# Patient Record
Sex: MALE | Race: OTHER | HISPANIC OR LATINO | URBAN - METROPOLITAN AREA
[De-identification: names, ages, dates, MRNs, and addresses within clinical notes are randomized per-mention and may not be internally consistent; named-entity substitution may affect disease eponyms.]

---

## 2021-07-15 ENCOUNTER — EMERGENCY (EMERGENCY)
Facility: HOSPITAL | Age: 61
LOS: 1 days | Discharge: HOME | End: 2021-07-15
Attending: STUDENT IN AN ORGANIZED HEALTH CARE EDUCATION/TRAINING PROGRAM | Admitting: STUDENT IN AN ORGANIZED HEALTH CARE EDUCATION/TRAINING PROGRAM
Payer: COMMERCIAL

## 2021-07-15 VITALS
DIASTOLIC BLOOD PRESSURE: 84 MMHG | OXYGEN SATURATION: 100 % | RESPIRATION RATE: 16 BRPM | HEART RATE: 76 BPM | SYSTOLIC BLOOD PRESSURE: 148 MMHG

## 2021-07-15 VITALS
TEMPERATURE: 98 F | OXYGEN SATURATION: 100 % | DIASTOLIC BLOOD PRESSURE: 95 MMHG | RESPIRATION RATE: 16 BRPM | HEART RATE: 80 BPM | SYSTOLIC BLOOD PRESSURE: 155 MMHG

## 2021-07-15 LAB
ALBUMIN SERPL ELPH-MCNC: 4.7 G/DL — SIGNIFICANT CHANGE UP (ref 3.3–5)
ALP SERPL-CCNC: 54 U/L — SIGNIFICANT CHANGE UP (ref 40–120)
ALT FLD-CCNC: 35 U/L — SIGNIFICANT CHANGE UP (ref 4–41)
ANION GAP SERPL CALC-SCNC: 13 MMOL/L — SIGNIFICANT CHANGE UP (ref 7–14)
AST SERPL-CCNC: 23 U/L — SIGNIFICANT CHANGE UP (ref 4–40)
BASOPHILS # BLD AUTO: 0.02 K/UL — SIGNIFICANT CHANGE UP (ref 0–0.2)
BASOPHILS NFR BLD AUTO: 0.4 % — SIGNIFICANT CHANGE UP (ref 0–2)
BILIRUB SERPL-MCNC: 0.3 MG/DL — SIGNIFICANT CHANGE UP (ref 0.2–1.2)
BUN SERPL-MCNC: 13 MG/DL — SIGNIFICANT CHANGE UP (ref 7–23)
CALCIUM SERPL-MCNC: 9.7 MG/DL — SIGNIFICANT CHANGE UP (ref 8.4–10.5)
CHLORIDE SERPL-SCNC: 103 MMOL/L — SIGNIFICANT CHANGE UP (ref 98–107)
CO2 SERPL-SCNC: 23 MMOL/L — SIGNIFICANT CHANGE UP (ref 22–31)
CREAT SERPL-MCNC: 0.98 MG/DL — SIGNIFICANT CHANGE UP (ref 0.5–1.3)
EOSINOPHIL # BLD AUTO: 0.02 K/UL — SIGNIFICANT CHANGE UP (ref 0–0.5)
EOSINOPHIL NFR BLD AUTO: 0.4 % — SIGNIFICANT CHANGE UP (ref 0–6)
GLUCOSE SERPL-MCNC: 134 MG/DL — HIGH (ref 70–99)
HCT VFR BLD CALC: 45.8 % — SIGNIFICANT CHANGE UP (ref 39–50)
HGB BLD-MCNC: 14.9 G/DL — SIGNIFICANT CHANGE UP (ref 13–17)
IANC: 2.9 K/UL — SIGNIFICANT CHANGE UP (ref 1.5–8.5)
IMM GRANULOCYTES NFR BLD AUTO: 0.2 % — SIGNIFICANT CHANGE UP (ref 0–1.5)
LYMPHOCYTES # BLD AUTO: 1.38 K/UL — SIGNIFICANT CHANGE UP (ref 1–3.3)
LYMPHOCYTES # BLD AUTO: 29.6 % — SIGNIFICANT CHANGE UP (ref 13–44)
MCHC RBC-ENTMCNC: 30.2 PG — SIGNIFICANT CHANGE UP (ref 27–34)
MCHC RBC-ENTMCNC: 32.5 GM/DL — SIGNIFICANT CHANGE UP (ref 32–36)
MCV RBC AUTO: 92.7 FL — SIGNIFICANT CHANGE UP (ref 80–100)
MONOCYTES # BLD AUTO: 0.33 K/UL — SIGNIFICANT CHANGE UP (ref 0–0.9)
MONOCYTES NFR BLD AUTO: 7.1 % — SIGNIFICANT CHANGE UP (ref 2–14)
NEUTROPHILS # BLD AUTO: 2.9 K/UL — SIGNIFICANT CHANGE UP (ref 1.8–7.4)
NEUTROPHILS NFR BLD AUTO: 62.3 % — SIGNIFICANT CHANGE UP (ref 43–77)
NRBC # BLD: 0 /100 WBCS — SIGNIFICANT CHANGE UP
NRBC # FLD: 0 K/UL — SIGNIFICANT CHANGE UP
PLATELET # BLD AUTO: 177 K/UL — SIGNIFICANT CHANGE UP (ref 150–400)
POTASSIUM SERPL-MCNC: 4.3 MMOL/L — SIGNIFICANT CHANGE UP (ref 3.5–5.3)
POTASSIUM SERPL-SCNC: 4.3 MMOL/L — SIGNIFICANT CHANGE UP (ref 3.5–5.3)
PROT SERPL-MCNC: 7.8 G/DL — SIGNIFICANT CHANGE UP (ref 6–8.3)
RBC # BLD: 4.94 M/UL — SIGNIFICANT CHANGE UP (ref 4.2–5.8)
RBC # FLD: 12.4 % — SIGNIFICANT CHANGE UP (ref 10.3–14.5)
SODIUM SERPL-SCNC: 139 MMOL/L — SIGNIFICANT CHANGE UP (ref 135–145)
WBC # BLD: 4.66 K/UL — SIGNIFICANT CHANGE UP (ref 3.8–10.5)
WBC # FLD AUTO: 4.66 K/UL — SIGNIFICANT CHANGE UP (ref 3.8–10.5)

## 2021-07-15 PROCEDURE — 99285 EMERGENCY DEPT VISIT HI MDM: CPT

## 2021-07-15 PROCEDURE — 99284 EMERGENCY DEPT VISIT MOD MDM: CPT

## 2021-07-15 NOTE — ED PROVIDER NOTE - NS ED ROS FT
GENERAL: No fever, no chills  EYES: +blurry vision  HEENT: no trouble swallowing, no trouble speaking  CARDIAC: no chest pain, no palpitations  PULMONARY: no cough, no SOB  GI: no abdominal pain, no nausea, no vomiting, no diarrhea, no constipation  : no dysuria, no frequency, no change in appearance, no odor of urine  SKIN: no rashes  NEURO: no headache, no weakness  MSK: no joint pain

## 2021-07-15 NOTE — ED ADULT NURSE NOTE - OBJECTIVE STATEMENT
Pt awake, alert and oriented x 4 c/o blurry vision to right eye for two days - describes it as seeing "a jellyfish."   No trauma to eye.   No facial droop, no gait change, no slurred speech, no chest pain or SOB, no fever.     IV placed and blood work sent.   Awaiting radiology and optho.

## 2021-07-15 NOTE — ED PROVIDER NOTE - OBJECTIVE STATEMENT
61y/o M w/ h/o HLD (non compliant with meds) p/w blurry vision for 2d, was driving and suddenly felt irritation and difficulty seeing, now seeing floaters in right eye. Washed eye with no relief. Floaters follow pt when looking in all directions. Denies fevers, chills, nausea, vomiting, headache, eye pain, conjunctivitis, diplopia, photophobia, pruritis, pain with ocular movements, sinus pain, contact use.

## 2021-07-15 NOTE — ED PROVIDER NOTE - NSFOLLOWUPCLINICS_GEN_ALL_ED_FT
[facility] - Ophthalmology  Ophthalmology  [address], [address]  [place] [address]  Phone: [phone]  Fax:   Follow Up Time: Urgent

## 2021-07-15 NOTE — ED ADULT TRIAGE NOTE - CHIEF COMPLAINT QUOTE
states " I am having blurry vision in my right eye since Tuesday , feels like a jelly in the middle and I can see around it". denies any injury. denies any past medical history. denies use of contacts. denies any pain. speaking in full sentences clearly.  patient is visiting from [place] since Friday.

## 2021-07-15 NOTE — CONSULT NOTE ADULT - SUBJECTIVE AND OBJECTIVE BOX
[facility] DEPARTMENT OF OPHTHALMOLOGY - INITIAL ADULT CONSULT  -----------------------------------------------------------------------------------------------------------------  Michel Dockery MD PGY 3  Pager: [phone]  -----------------------------------------------------------------------------------------------------------------    HPI: 59 y/o M with HLD who presented for right eye vision blurry vision. On Tuesday, while driving from [place] to [place], patient had foreign body sensation right eye. He then noticed he had blurry vision and little black spots centrally in the right eye with sparing of peripheral vision. Patient reports this has worsened. Denies any other similar episode. Denies any eye pain, flashes of light.    Past Medical History: HLD  Past Ocular History: None  Drops: None  Allergies: No known allergies to medications  Family History: No family history of eye diseases  Outpatient Ophthalmologist: None      Review of Systems:  Constitutional: No fever, chills  Eyes: Admits to blurry vision and floaters right eye. No flashes, FBS, erythema, discharge, double vision, OU  Neuro: No tremors  Cardiovascular: No chest pain, palpitations  Respiratory: No SOB, no cough  GI: No nausea, vomiting, abdominal pain    Vital Signs: T(C): 36.7 (07-15-21 @ 10:43)  T(F): 98.1 (07-15-21 @ 10:43), Max: 98.1 (07-15-21 @ 10:43)  HR: 80 (07-15-21 @ 10:43) (80 - 80)  BP: 155/95 (07-15-21 @ 10:43) (155/95 - 155/95)  RR:  (16 - 16)  SpO2:  (100% - 100%)  Wt(kg): --  General: AAO x 3, appropriate mood and affect    Ophthalmology Exam:  Visual acuity (cc): 20/20 OU.  Pupils: PERRL OU, no APD  Intraocular Pressure:    Extraocular movements (EOMs): Full OU, no pain, no diplopia.  Confrontational Visual Field (CVF): Full OU.  Color Plates: 12/12 OU.    Pen Light Exam (PLE)  External: Normal OU.  Lids/Lashes/Lacrimal Ducts: Flat OU.  Sclera/Conjunctiva: White and quiet OU.  Cornea: Clear OU.  Anterior Chamber: Deep and formed OU.    Iris: Flat OU.  Lens: Clear OU.    Fundus Exam: dilated with 1% tropicamide and 2.5% phenylephrine  Approval obtained from primary team for dilation  Patient aware that pupils can remained dilated for at least 4-6 hours.  Exam performed with 20 D lens    Vitreous: wnl OU  Disc, cup/disc: sharp and pink, 0.4 OU  Macula: wnl OU  Vessels: wnl OU  Periphery: wnl OU    Labs/Imaging:  ***   [facility] DEPARTMENT OF OPHTHALMOLOGY - INITIAL ADULT CONSULT  -----------------------------------------------------------------------------------------------------------------  Michel Dockery MD PGY 3  Pager: [phone]  -----------------------------------------------------------------------------------------------------------------    HPI: 61 y/o M with HLD who presented for right eye vision blurry vision. On Tuesday, while driving from [place] to [place], patient had foreign body sensation right eye. He then noticed he had blurry vision and little black spots centrally in the right eye with sparing of peripheral vision. Patient reports this has worsened. Denies any other similar episode. Denies any eye pain, flashes of light.    Past Medical History: HLD  Past Ocular History: None  Drops: None  Allergies: No known allergies to medications  Family History: No family history of eye diseases  Outpatient Ophthalmologist: None      Review of Systems:  Constitutional: No fever, chills  Eyes: Admits to blurry vision and floaters right eye. No flashes, FBS, erythema, discharge, double vision, OU  Neuro: No tremors  Cardiovascular: No chest pain, palpitations  Respiratory: No SOB, no cough  GI: No nausea, vomiting, abdominal pain    Vital Signs: T(C): 36.7 (07-15-21 @ 10:43)  T(F): 98.1 (07-15-21 @ 10:43), Max: 98.1 (07-15-21 @ 10:43)  HR: 80 (07-15-21 @ 10:43) (80 - 80)  BP: 155/95 (07-15-21 @ 10:43) (155/95 - 155/95)  RR:  (16 - 16)  SpO2:  (100% - 100%)  Wt(kg): --  General: AAO x 3, appropriate mood and affect    Ophthalmology Exam:  Visual acuity (cc): 20/25 (Pt needed to move head around) OD, 20/20 OS  Pupils: PERRL OU, no APD  Intraocular Pressure: 13, 14  Extraocular movements (EOMs): Full OU, no pain, no diplopia.  Confrontational Visual Field (CVF): Full OU.  Color Plates: 12/12 OU. OD needed to move head and color plates around to visualize.    Pen Light Exam (PLE)  External: Normal OU.  Lids/Lashes/Lacrimal Ducts: Flat OU.  Sclera/Conjunctiva: White and quiet OU.  Cornea: Clear OU.  Anterior Chamber: Deep and formed OU.    Iris: Flat OU.  Lens: Clear OU.    Fundus Exam: dilated with 1% tropicamide and 2.5% phenylephrine 1:04pm  Approval obtained from primary team for dilation  Patient aware that pupils can remained dilated for at least 4-6 hours.  Exam performed with 20 D lens    ___________________   [facility] DEPARTMENT OF OPHTHALMOLOGY - INITIAL ADULT CONSULT  -----------------------------------------------------------------------------------------------------------------  Michel Dockery MD PGY 3  Pager: [phone]  -----------------------------------------------------------------------------------------------------------------    HPI: 59 y/o M with HLD who presented for right eye vision blurry vision. On Tuesday, while driving from [place] to [place], patient had foreign body sensation right eye. He then noticed he had blurry vision and little black spots centrally in the right eye with sparing of peripheral vision. Patient reports this has worsened. Denies any other similar episode. Denies any eye pain, flashes of light.    Past Medical History: HLD  Past Ocular History: None  Drops: None  Allergies: No known allergies to medications  Family History: No family history of eye diseases  Outpatient Ophthalmologist: None      Review of Systems:  Constitutional: No fever, chills  Eyes: Admits to blurry vision and floaters right eye. No flashes, FBS, erythema, discharge, double vision, OU  Neuro: No tremors  Cardiovascular: No chest pain, palpitations  Respiratory: No SOB, no cough  GI: No nausea, vomiting, abdominal pain    Vital Signs: T(C): 36.7 (07-15-21 @ 10:43)  T(F): 98.1 (07-15-21 @ 10:43), Max: 98.1 (07-15-21 @ 10:43)  HR: 80 (07-15-21 @ 10:43) (80 - 80)  BP: 155/95 (07-15-21 @ 10:43) (155/95 - 155/95)  RR:  (16 - 16)  SpO2:  (100% - 100%)  Wt(kg): --  General: AAO x 3, appropriate mood and affect    Ophthalmology Exam:  Visual acuity (cc): 20/25 (Pt needed to move head around) OD, 20/20 OS  Pupils: PERRL OU, no APD  Intraocular Pressure: 13, 14  Extraocular movements (EOMs): Full OU, no pain, no diplopia.  Confrontational Visual Field (CVF): Full OU.  Color Plates: 12/12 OU. OD needed to move head and color plates around to visualize.    Pen Light Exam (PLE)  External: Normal OU.  Lids/Lashes/Lacrimal Ducts: Flat OU.  Sclera/Conjunctiva: White and quiet OU.  Cornea: Clear OU.  Anterior Chamber: Deep and formed OU.    Iris: Flat OU.  Lens: Clear OU.    Fundus Exam: dilated with 1% tropicamide and 2.5% phenylephrine 1:04pm  Approval obtained from primary team for dilation  Patient aware that pupils can remained dilated for at least 4-6 hours.  Exam performed with 20 D lens    Vitreous: Vitreous hemorrhage OD. within normal limits OS  Disc, cup/disc: sharp and pink, 0.4 OS  Macula: within normal limits OU  Vessels: within normal limits OU  Periphery: Superotemporal tear OD. wnl OS    Assessment and Recommendations:  60y male with a past medical history/ocular history of HLD consulted for blurry vision right eye, found to have vitreous hemorrhage and tear superotemporally.    -Send to clinic (address below) [facility] for retina evaluation and possible laser.    Seen and discussed with Dr. Mason.    Outpatient Follow-up: Patient should follow-up with his/her ophthalmologist or with [facility] Department of Ophthalmology within 1 week of after discharge at:    [address]  [place] [address]  [phone]    Michel Dockery MD, PGY-3  Pager: [phone]  Also available on Microsoft Teams       [facility] DEPARTMENT OF OPHTHALMOLOGY - INITIAL ADULT CONSULT  -----------------------------------------------------------------------------------------------------------------  Michel Dockery MD PGY 3  Pager: [phone]  -----------------------------------------------------------------------------------------------------------------    HPI: 61 y/o M with HLD who presented for right eye vision blurry vision. On Tuesday, while driving from [place] to [place], patient had foreign body sensation right eye. He then noticed he had blurry vision and little black spots centrally in the right eye with sparing of peripheral vision. Patient reports this has worsened. Denies any other similar episode. Denies any eye pain, flashes of light.    Past Medical History: HLD  Past Ocular History: None  Drops: None  Allergies: No known allergies to medications  Family History: No family history of eye diseases  Outpatient Ophthalmologist: None      Review of Systems:  Constitutional: No fever, chills  Eyes: Admits to blurry vision and floaters right eye. No flashes, FBS, erythema, discharge, double vision, OU  Neuro: No tremors  Cardiovascular: No chest pain, palpitations  Respiratory: No SOB, no cough  GI: No nausea, vomiting, abdominal pain    Vital Signs: T(C): 36.7 (07-15-21 @ 10:43)  T(F): 98.1 (07-15-21 @ 10:43), Max: 98.1 (07-15-21 @ 10:43)  HR: 80 (07-15-21 @ 10:43) (80 - 80)  BP: 155/95 (07-15-21 @ 10:43) (155/95 - 155/95)  RR:  (16 - 16)  SpO2:  (100% - 100%)  Wt(kg): --  General: AAO x 3, appropriate mood and affect    Ophthalmology Exam:  Visual acuity (cc): 20/25 (Pt needed to move head around) OD, 20/20 OS  Pupils: PERRL OU, no APD  Intraocular Pressure: 13, 14  Extraocular movements (EOMs): Full OU, no pain, no diplopia.  Confrontational Visual Field (CVF): Full OU.  Color Plates: 12/12 OU. OD needed to move head and color plates around to visualize.    Pen Light Exam (PLE)  External: Normal OU.  Lids/Lashes/Lacrimal Ducts: Flat OU.  Sclera/Conjunctiva: White and quiet OU.  Cornea: Clear OU.  Anterior Chamber: Deep and formed OU.    Iris: Flat OU.  Lens: Clear OU.    Fundus Exam: dilated with 1% tropicamide and 2.5% phenylephrine 1:04pm  Approval obtained from primary team for dilation  Patient aware that pupils can remained dilated for at least 4-6 hours.  Exam performed with 20 D lens    Vitreous: Vitreous hemorrhage OD. within normal limits OS  Disc, cup/disc: sharp and pink, 0.4 OS  Macula: within normal limits OU  Vessels: within normal limits OU  Periphery: Superotemporal tear OD. wnl OS    Assessment and Recommendations:  60y male with a past medical history/ocular history of HLD consulted for blurry vision right eye, found to have vitreous hemorrhage and tear superotemporally.    1. Vitreous hemorrhage 2/2 retinal tear  -Patient reported 2 days of painless, central blurry vision of right eye associated with floaters.  -On exam, patient found to have vitreous detachment right eye along with a retinal tear superotemporally.   -Send to clinic (address below) [facility] for retina evaluation and possible laser.    UPDATE: Patient seen by vitreoretinal consultants and retinal tear was lasered.    Seen and discussed with Dr. Mason, ophthalmic hospitalist.    Outpatient Follow-up: Patient should follow-up with his/her ophthalmologist or with [facility] Department of Ophthalmology within 1 week of after discharge at:    [address]  [place] [address]  [phone]    Michel Dockery MD, PGY-3  Pager: [phone]  Also available on [employer] Teams

## 2021-07-15 NOTE — ED PROVIDER NOTE - PHYSICAL EXAMINATION
Gen: NAD, non-toxic appearing  Head: normal appearing  HEENT: 20/200 in R eye, 20/20 in L, normal conjunctiva, oral mucosa moist  Lung: no respiratory distress, speaking in full sentences, CTA b/l     CV: regular rate and rhythm, no murmurs  Abd: soft, non distended, non tender   MSK: no visible deformities  Neuro: No focal deficits, AAOx3  Skin: Warm  Psych: normal affect

## 2021-07-15 NOTE — ED PROVIDER NOTE - ATTENDING CONTRIBUTION TO CARE
I have personally performed a face to face medical and diagnostic evaluation of the patient. I have discussed with and reviewed the Resident's note and agree with the History, ROS, Physical Exam and MDM unless otherwise indicated. A brief summary of my personal evaluation and impression can be found below.    59yo M hx hld w/ blurry vision x 2 days suddenly felt irritation and developed gradual difficulty seeing w/ floaters in eye. No pain, fever, chills, focal neuro deficit complaints, trauma, pain with EOM, contact use, or HA/n/v.  VITALS: Initial triage and subsequent vitals have been reviewed by me.  Gen: Well appearing, NAD, alert, non-toxic  Head: NCAT  HEENT: PERRL, MMM, normal conjunctiva, anicteric, neck supple  Lung: CTAB, no adventitious sounds  CV: RRR, no murmurs, 2+symmetric peripheral pulses  Abd: soft, NTND, no rebound or guarding, no palpable masses  MSK: No edema, no visible deformities  Neuro: Following commands appropriately, fluid speech, CN II-XII grossly intact. 5/5 strength and normal sensation in all extremities. Ambulatory with stable gait.  Skin: Warm and dry, no evidence of rash  Psych: normal mood and affect   VA: Only able to see large shapes cannot read fine print to do snellen chart  POCUS w/ findings suggestive of vitreal hemorrhage and poss detachment, emergent ophtho c/s

## 2021-07-15 NOTE — ED PROVIDER NOTE - PATIENT PORTAL LINK FT
You can access the FollowMyHealth Patient Portal offered by [facility] by registering at the following website: [email or web address]. By joining [facility]’s FollowMyHealth portal, you will also be able to view your health information using other applications (apps) compatible with our system.

## 2021-07-15 NOTE — ED PROVIDER NOTE - PROGRESS NOTE DETAILS
ophthalmology contacted concern for vitreous/retinal detachment. will see pt in ED Ej, PGY3: spoke with ophtho who needs to go to clinic immediately for repair. pt stable and with wife who can drive ophthalmology contacted concern for vitreous/retinal detachment seen on POCUS. will see pt in ED

## 2021-07-15 NOTE — ED PROVIDER NOTE - NSFOLLOWUPINSTRUCTIONS_ED_ALL_ED_FT
-Please go directly to ophthalmology clinic imeediately for vitreous hemorrhage and tear repair    -Please take any prescribed medications as instructed by your PMD    - Be sure to return to the ED if you develop new or worsening symptoms. Specific signs and symptoms to be vigilant of: fever or chills, chest pain, difficulty breathing, palpitations, loss of consciousness, headache, vision changes, slurred speech, difficulty swallowing or drooling, facial droop, weakness in the arms or legs, numbness or tingling, abdominal pain, nausea or vomiting, diarrhea, constipation, blood in the stool or urine, pain on urination, difficulty urinating.

## 2021-07-15 NOTE — ED PROVIDER NOTE - CLINICAL SUMMARY MEDICAL DECISION MAKING FREE TEXT BOX
59y/o M p/w sudden blurry vision for 2d a/w floaters and 20/200 vision in eye concern for vitreous/retinal detachment. Plan emergent ophtho consult